# Patient Record
Sex: FEMALE | Race: WHITE | Employment: UNEMPLOYED | URBAN - METROPOLITAN AREA
[De-identification: names, ages, dates, MRNs, and addresses within clinical notes are randomized per-mention and may not be internally consistent; named-entity substitution may affect disease eponyms.]

---

## 2020-11-03 ENCOUNTER — HOSPITAL ENCOUNTER (OUTPATIENT)
Age: 25
Setting detail: OBSERVATION
Discharge: HOME | End: 2020-11-03
Attending: OBSTETRICS & GYNECOLOGY | Admitting: OBSTETRICS & GYNECOLOGY

## 2020-11-03 VITALS
SYSTOLIC BLOOD PRESSURE: 123 MMHG | TEMPERATURE: 97.7 F | DIASTOLIC BLOOD PRESSURE: 76 MMHG | HEART RATE: 88 BPM | RESPIRATION RATE: 16 BRPM

## 2020-11-03 PROBLEM — Z3A.34 34 WEEKS GESTATION OF PREGNANCY: Status: ACTIVE | Noted: 2020-11-03

## 2020-11-03 LAB
ABO/RH: NORMAL
ALBUMIN SERPL-MCNC: 3.8 G/DL (ref 3.5–5.2)
ALP BLD-CCNC: 103 U/L (ref 35–104)
ALT SERPL-CCNC: 8 U/L (ref 0–32)
ANION GAP SERPL CALCULATED.3IONS-SCNC: 11 MMOL/L (ref 7–16)
ANTIBODY SCREEN: NORMAL
AST SERPL-CCNC: 13 U/L (ref 0–31)
BACTERIA: ABNORMAL /HPF
BASOPHILS ABSOLUTE: 0.02 E9/L (ref 0–0.2)
BASOPHILS RELATIVE PERCENT: 0.2 % (ref 0–2)
BILIRUB SERPL-MCNC: <0.2 MG/DL (ref 0–1.2)
BILIRUBIN URINE: NEGATIVE
BLOOD, URINE: ABNORMAL
BUN BLDV-MCNC: 6 MG/DL (ref 6–20)
CALCIUM SERPL-MCNC: 10 MG/DL (ref 8.6–10.2)
CHLORIDE BLD-SCNC: 103 MMOL/L (ref 98–107)
CLARITY: CLEAR
CO2: 22 MMOL/L (ref 22–29)
COLOR: YELLOW
CREAT SERPL-MCNC: 0.5 MG/DL (ref 0.5–1)
EOSINOPHILS ABSOLUTE: 0.17 E9/L (ref 0.05–0.5)
EOSINOPHILS RELATIVE PERCENT: 1.8 % (ref 0–6)
EPITHELIAL CELLS, UA: ABNORMAL /HPF
FETAL FIBRONECTIN: NEGATIVE
GFR AFRICAN AMERICAN: >60
GFR NON-AFRICAN AMERICAN: >60 ML/MIN/1.73
GLUCOSE BLD-MCNC: 77 MG/DL (ref 74–99)
GLUCOSE URINE: NEGATIVE MG/DL
HCT VFR BLD CALC: 35.3 % (ref 34–48)
HEMOGLOBIN: 11.2 G/DL (ref 11.5–15.5)
IMMATURE GRANULOCYTES #: 0.05 E9/L
IMMATURE GRANULOCYTES %: 0.5 % (ref 0–5)
KETONES, URINE: ABNORMAL MG/DL
LEUKOCYTE ESTERASE, URINE: ABNORMAL
LYMPHOCYTES ABSOLUTE: 1.61 E9/L (ref 1.5–4)
LYMPHOCYTES RELATIVE PERCENT: 16.7 % (ref 20–42)
MCH RBC QN AUTO: 26.3 PG (ref 26–35)
MCHC RBC AUTO-ENTMCNC: 31.7 % (ref 32–34.5)
MCV RBC AUTO: 82.9 FL (ref 80–99.9)
MONOCYTES ABSOLUTE: 0.55 E9/L (ref 0.1–0.95)
MONOCYTES RELATIVE PERCENT: 5.7 % (ref 2–12)
NEUTROPHILS ABSOLUTE: 7.23 E9/L (ref 1.8–7.3)
NEUTROPHILS RELATIVE PERCENT: 75.1 % (ref 43–80)
NITRITE, URINE: NEGATIVE
PDW BLD-RTO: 14.3 FL (ref 11.5–15)
PH UA: 6.5 (ref 5–9)
PLATELET # BLD: 237 E9/L (ref 130–450)
PMV BLD AUTO: 10.3 FL (ref 7–12)
POTASSIUM SERPL-SCNC: 3.7 MMOL/L (ref 3.5–5)
PROTEIN UA: NEGATIVE MG/DL
RBC # BLD: 4.26 E12/L (ref 3.5–5.5)
RBC UA: ABNORMAL /HPF (ref 0–2)
SODIUM BLD-SCNC: 136 MMOL/L (ref 132–146)
SPECIFIC GRAVITY UA: >=1.03 (ref 1–1.03)
TOTAL PROTEIN: 7.2 G/DL (ref 6.4–8.3)
UROBILINOGEN, URINE: 0.2 E.U./DL
WBC # BLD: 9.6 E9/L (ref 4.5–11.5)
WBC UA: ABNORMAL /HPF (ref 0–5)

## 2020-11-03 PROCEDURE — 86592 SYPHILIS TEST NON-TREP QUAL: CPT

## 2020-11-03 PROCEDURE — 86901 BLOOD TYPING SEROLOGIC RH(D): CPT

## 2020-11-03 PROCEDURE — 87491 CHLMYD TRACH DNA AMP PROBE: CPT

## 2020-11-03 PROCEDURE — 99222 1ST HOSP IP/OBS MODERATE 55: CPT | Performed by: OBSTETRICS & GYNECOLOGY

## 2020-11-03 PROCEDURE — 86703 HIV-1/HIV-2 1 RESULT ANTBDY: CPT

## 2020-11-03 PROCEDURE — 76819 FETAL BIOPHYS PROFIL W/O NST: CPT

## 2020-11-03 PROCEDURE — 80053 COMPREHEN METABOLIC PANEL: CPT

## 2020-11-03 PROCEDURE — 76821 MIDDLE CEREBRAL ARTERY ECHO: CPT

## 2020-11-03 PROCEDURE — 81001 URINALYSIS AUTO W/SCOPE: CPT

## 2020-11-03 PROCEDURE — 87340 HEPATITIS B SURFACE AG IA: CPT

## 2020-11-03 PROCEDURE — G0378 HOSPITAL OBSERVATION PER HR: HCPCS

## 2020-11-03 PROCEDURE — 82731 ASSAY OF FETAL FIBRONECTIN: CPT

## 2020-11-03 PROCEDURE — 76820 UMBILICAL ARTERY ECHO: CPT

## 2020-11-03 PROCEDURE — 86900 BLOOD TYPING SEROLOGIC ABO: CPT

## 2020-11-03 PROCEDURE — 87591 N.GONORRHOEAE DNA AMP PROB: CPT

## 2020-11-03 PROCEDURE — 99201 HC NEW PT, OUTPT VISIT LEVEL 1: CPT

## 2020-11-03 PROCEDURE — 76811 OB US DETAILED SNGL FETUS: CPT

## 2020-11-03 PROCEDURE — 86850 RBC ANTIBODY SCREEN: CPT

## 2020-11-03 PROCEDURE — 36415 COLL VENOUS BLD VENIPUNCTURE: CPT

## 2020-11-03 PROCEDURE — 85025 COMPLETE CBC W/AUTO DIFF WBC: CPT

## 2020-11-03 PROCEDURE — 86762 RUBELLA ANTIBODY: CPT

## 2020-11-03 RX ORDER — SODIUM CHLORIDE, SODIUM LACTATE, POTASSIUM CHLORIDE, AND CALCIUM CHLORIDE .6; .31; .03; .02 G/100ML; G/100ML; G/100ML; G/100ML
500 INJECTION, SOLUTION INTRAVENOUS ONCE
Status: DISCONTINUED | OUTPATIENT
Start: 2020-11-03 | End: 2020-11-03 | Stop reason: HOSPADM

## 2020-11-03 RX ORDER — CEPHALEXIN 500 MG/1
500 CAPSULE ORAL 2 TIMES DAILY
Qty: 14 CAPSULE | Refills: 0 | Status: ON HOLD | OUTPATIENT
Start: 2020-11-03 | End: 2020-12-20 | Stop reason: HOSPADM

## 2020-11-03 RX ORDER — SODIUM CHLORIDE, SODIUM LACTATE, POTASSIUM CHLORIDE, CALCIUM CHLORIDE 600; 310; 30; 20 MG/100ML; MG/100ML; MG/100ML; MG/100ML
INJECTION, SOLUTION INTRAVENOUS CONTINUOUS
Status: DISCONTINUED | OUTPATIENT
Start: 2020-11-03 | End: 2020-11-03 | Stop reason: HOSPADM

## 2020-11-03 SDOH — HEALTH STABILITY: MENTAL HEALTH: HOW OFTEN DO YOU HAVE A DRINK CONTAINING ALCOHOL?: NEVER

## 2020-11-03 NOTE — PROGRESS NOTES
Patient was evaluated by Dr. Anamaria Major. As by Dr. Anamaria Major BB size corresponds to the gestational age of 34-weeks and from his point of view patient can be discharged.   We will order prenatal lab tests for the results of the fetal fibronectin test if fetal fibronectin test is negative we will discharge the patient and follow-up in the clinic

## 2020-11-03 NOTE — H&P
wheezing  CARDIOVASCULAR:  normal S1 and S2  ABDOMEN: Gravid, soft, nontender      Fetal fibronectin test obtained. No history of recent sexual intercourse for the past 24 hours. .    Cervix:             Dilation:  Closed         Effacement:  Long            Fetal heart rate:         Baseline Heart Rate:  140        Accelerations:  present       Decelerations: absent       Variability:  moderate    Contraction frequency: 0 minutes        General Labs:    CBC: No results found for: WBC, RBC, HGB, HCT, MCV, RDW, PLT  CMP:  No results found for: NA, K, CL, CO2, BUN, PROT, ALB  U/A:  No components found for: Yvonne [place], USPGRAV, UPH, UPROTEIN, UGLUCOSE, UKETONE, UBILI, UBLOOD, [place], [place], [place], [facility], [place], [facility], [facility], [place]        ASSESSMENT & PLAN:   82-year-old female G1, P0 with an EDC of December 12 without any prenatal care complaining of lower abdominal cramping. Reports having urinary frequency. She states that she got prenatal care in [place] and had some blood Test.  Plan admit, observation, urine analysis, fetal fibronectin, MFM consultation, obtain her lab prenatal records from [place]. If no labs available order prenatal lab test should be ordered.

## 2020-11-03 NOTE — PROGRESS NOTES
Patient and  state they had prenatal care with [facility] and gave me a number to reach them at. Attempted to call and were told they cannot access medical records after hours and have no lab to contact at this time.

## 2020-11-04 LAB
HEPATITIS B SURFACE ANTIGEN INTERPRETATION: NORMAL
HIV-1 AND HIV-2 ANTIBODIES: NORMAL
RPR: NORMAL
RUBELLA ANTIBODY IGG: NORMAL

## 2020-11-04 NOTE — PROGRESS NOTES
Patient refusing GBS swab at this time. Patient educated on importance on swab by Monique Goel. Still refusing.

## 2020-11-04 NOTE — PROGRESS NOTES
Patient okay for discharge per house. Patient discharged, instructions explained to patient and FOB. All questions answered. Explained prescription -- picking up medication, taking medication for entirety, dosage and times. Verbalizes understanding. Discussed follow up in the clinic - given phone number and address to clinic. Patient and FOB agreeable. Patient ambulatory on discharge.

## 2020-11-04 NOTE — PROGRESS NOTES
Discussed collection of GBS screening with  and patient at length. Patient declines GBS screen at this time.

## 2020-11-05 ENCOUNTER — TELEPHONE (OUTPATIENT)
Dept: ADMINISTRATIVE | Age: 25
End: 2020-11-05

## 2020-11-05 ENCOUNTER — TELEPHONE (OUTPATIENT)
Dept: OBGYN | Age: 25
End: 2020-11-05

## 2020-11-05 NOTE — TELEPHONE ENCOUNTER
Pt  New to [facility]  from [place]. 8 months 1 week .  Appt  Rejected by   Scheduling  In  Dec .

## 2020-11-05 NOTE — TELEPHONE ENCOUNTER
Patient called, spoke to  Caitie. Appointment scheduled for 11/11/20 @ 10:30 per Dr. Lilo Garner. Caitie given office address and phone number as well.     -Johana, 11/5/20

## 2020-11-06 LAB
C. TRACHOMATIS DNA ,URINE: NEGATIVE
N. GONORRHOEAE DNA, URINE: NEGATIVE
SOURCE: NORMAL

## 2020-11-11 ENCOUNTER — INITIAL PRENATAL (OUTPATIENT)
Dept: OBGYN | Age: 25
End: 2020-11-11

## 2020-11-11 VITALS — DIASTOLIC BLOOD PRESSURE: 63 MMHG | HEART RATE: 95 BPM | SYSTOLIC BLOOD PRESSURE: 135 MMHG | WEIGHT: 196 LBS

## 2020-11-11 PROBLEM — Z3A.34 34 WEEKS GESTATION OF PREGNANCY: Status: RESOLVED | Noted: 2020-11-03 | Resolved: 2020-11-11

## 2020-11-11 LAB
AMPHETAMINE SCREEN, URINE: NOT DETECTED
BARBITURATE SCREEN URINE: NOT DETECTED
BENZODIAZEPINE SCREEN, URINE: NOT DETECTED
CANNABINOID SCREEN URINE: NOT DETECTED
COCAINE METABOLITE SCREEN URINE: NOT DETECTED
FENTANYL SCREEN, URINE: NOT DETECTED
GLUCOSE URINE, POC: NORMAL
Lab: NORMAL
METHADONE SCREEN, URINE: NOT DETECTED
OPIATE SCREEN URINE: NOT DETECTED
OXYCODONE URINE: NOT DETECTED
PHENCYCLIDINE SCREEN URINE: NOT DETECTED
PROTEIN UA: ABNORMAL

## 2020-11-11 PROCEDURE — 99203 OFFICE O/P NEW LOW 30 MIN: CPT | Performed by: OBSTETRICS & GYNECOLOGY

## 2020-11-11 PROCEDURE — 81002 URINALYSIS NONAUTO W/O SCOPE: CPT | Performed by: OBSTETRICS & GYNECOLOGY

## 2020-11-11 NOTE — PROGRESS NOTES
Karan Zamudio      present, helped to translate. Patient presents for new OB visit. Patient was seen on L&D 11/3/20. She was a transfer from [facility], though only had limited prenatal care. Per patient her EDC is 12/12/20. Patient had prenatal labs and GC/ CT at that time. Patient refused GBS. Will collect today. Patient did have ultrasound by Dr. Hermelinda Mcgrath on L&D. Growth consistent with 34 weeks at that time. No report is available. Patient was treated for UTI, will collect repeat urine culture. Discussed flu vaccine, patient declines. Baby is moving. Denies contractions. History reviewed. No pertinent past medical history. History reviewed. No pertinent surgical history. History reviewed. No pertinent family history. Current Outpatient Medications:     Prenatal MV-Min-Fe Fum-FA-DHA (PRENATAL 1 PO), Take by mouth, Disp: , Rfl:     cephALEXin (KEFLEX) 500 MG capsule, Take 1 capsule by mouth 2 times daily, Disp: 14 capsule, Rfl: 0     No Known Allergies     Social History     Tobacco History     Smoking Status  Never Smoker    Smokeless Tobacco Use  Never Used          Alcohol History     Alcohol Use Status  Never          Drug Use     Drug Use Status  Never          Sexual Activity     Sexually Active  Not Asked                 Vitals:    11/11/20 1047   BP: 135/63   Pulse: 95        Physical Exam:  General: pleasant, alert     Pelvic exam: GBS collected     Fetal heart rate: 145  Fundal height: 34cm    Luis Carlos Marley was seen today for initial prenatal visit. Diagnoses and all orders for this visit:    No prenatal care in current pregnancy in third trimester    Prenatal care in third trimester  -     Culture, Urine; Future  -     Urine Drug Screen; Future  -     Ambulatory referral to Maternal Fetal  -     Strep B screen      Will sign release of records for prenatal records from [place]. Return in about 1 week (around 11/18/2020) for OB visit.      Ilene Pallas, MD

## 2020-11-11 NOTE — PROGRESS NOTES
New Patient alert and pleasant with no complaints. Here today with  for initial prenatal visit. Fetal heart tones obtained without difficulty. Urine for glucose  obtained with negative results. Urine for protein obtained with +1 results  Urine for culture obtained, labeled and sent to lab  Strep B culture obtained, labeled and sent to lab  Patient transfer from [place]. Patient signed medical release form at this time  Discharge instructions have been discussed with the patient. Patient advised to call our office with any questions or concerns. Voiced understanding.

## 2020-11-13 LAB — URINE CULTURE, ROUTINE: NORMAL

## 2020-11-14 LAB — GROUP B STREP CULTURE: NORMAL

## 2020-11-18 ENCOUNTER — ROUTINE PRENATAL (OUTPATIENT)
Dept: OBGYN | Age: 25
End: 2020-11-18

## 2020-11-18 VITALS — DIASTOLIC BLOOD PRESSURE: 71 MMHG | SYSTOLIC BLOOD PRESSURE: 125 MMHG | WEIGHT: 194 LBS | HEART RATE: 97 BPM

## 2020-11-18 LAB
GLUCOSE URINE, POC: NEGATIVE
PROTEIN UA: NEGATIVE

## 2020-11-18 PROCEDURE — 81002 URINALYSIS NONAUTO W/O SCOPE: CPT | Performed by: OBSTETRICS & GYNECOLOGY

## 2020-11-18 PROCEDURE — 99213 OFFICE O/P EST LOW 20 MIN: CPT | Performed by: OBSTETRICS & GYNECOLOGY

## 2020-11-18 NOTE — PROGRESS NOTES
Patient alert and pleasant with no complaints. Here today for prenatal visit. Fetal heart tones obtained without difficulty. Urine for glucose and protein obtained with negative results. Discharge instructions have been discussed with the patient. Patient advised to call our office with any questions or concerns. Voiced understanding.

## 2020-11-23 ENCOUNTER — ROUTINE PRENATAL (OUTPATIENT)
Dept: OBGYN | Age: 25
End: 2020-11-23

## 2020-11-23 VITALS — WEIGHT: 201 LBS | DIASTOLIC BLOOD PRESSURE: 70 MMHG | HEART RATE: 95 BPM | SYSTOLIC BLOOD PRESSURE: 120 MMHG

## 2020-11-23 LAB
GLUCOSE URINE, POC: NEGATIVE
PROTEIN UA: NEGATIVE

## 2020-11-23 PROCEDURE — 99213 OFFICE O/P EST LOW 20 MIN: CPT | Performed by: OBSTETRICS & GYNECOLOGY

## 2020-11-23 PROCEDURE — 81002 URINALYSIS NONAUTO W/O SCOPE: CPT | Performed by: OBSTETRICS & GYNECOLOGY

## 2020-11-23 NOTE — PROGRESS NOTES
Claudia Reyes    Patient presents for routine prenatal visit today at 37w2d. Appointment with MFM 11/24/20. Denies complaints  Denies VB, or cramping  Feeling movement at this time. Reviewed labor precautions and kick counts. Fetal Hr: 135    GBS negative  Gonorrhea/ chlamydia negative     Discussed flu vaccine and Tdap, patient again declines. All questions and concerns addressed at this time      ICD-10-CM    1. Prenatal care in third trimester  Z34.93 POCT urine qual dipstick protein     POCT urine qual dipstick glucose        Return in about 1 week (around 11/30/2020) for OB visit.     Carlene Collins MD

## 2020-11-24 ENCOUNTER — ROUTINE PRENATAL (OUTPATIENT)
Dept: OBGYN CLINIC | Age: 25
End: 2020-11-24

## 2020-11-24 VITALS
HEART RATE: 107 BPM | SYSTOLIC BLOOD PRESSURE: 114 MMHG | DIASTOLIC BLOOD PRESSURE: 77 MMHG | TEMPERATURE: 97.1 F | WEIGHT: 199 LBS

## 2020-11-24 PROBLEM — Z3A.37 37 WEEKS GESTATION OF PREGNANCY: Status: ACTIVE | Noted: 2020-11-24

## 2020-11-24 LAB
GLUCOSE URINE, POC: NEGATIVE
PROTEIN UA: POSITIVE

## 2020-11-24 PROCEDURE — 76820 UMBILICAL ARTERY ECHO: CPT | Performed by: OBSTETRICS & GYNECOLOGY

## 2020-11-24 PROCEDURE — 99203 OFFICE O/P NEW LOW 30 MIN: CPT | Performed by: OBSTETRICS & GYNECOLOGY

## 2020-11-24 PROCEDURE — 76819 FETAL BIOPHYS PROFIL W/O NST: CPT | Performed by: OBSTETRICS & GYNECOLOGY

## 2020-11-24 PROCEDURE — 81002 URINALYSIS NONAUTO W/O SCOPE: CPT | Performed by: OBSTETRICS & GYNECOLOGY

## 2020-11-24 PROCEDURE — 99213 OFFICE O/P EST LOW 20 MIN: CPT | Performed by: OBSTETRICS & GYNECOLOGY

## 2020-11-24 PROCEDURE — 76821 MIDDLE CEREBRAL ARTERY ECHO: CPT | Performed by: OBSTETRICS & GYNECOLOGY

## 2020-11-24 PROCEDURE — 76816 OB US FOLLOW-UP PER FETUS: CPT | Performed by: OBSTETRICS & GYNECOLOGY

## 2020-11-24 NOTE — PATIENT INSTRUCTIONS
Please arrive for your scheduled appointment at least 15 minutes early with your actual insurance card+ a photo ID. Also if you need any refills ordered or have questions, it may take up 48 hours to reply. Please allow ample time for your refills. Call me when you use last refill. Thank you for your cooperation. Any questions contact Mariah at [phone]. If you are experiencing an emergency and need immediate help, call 911 or go to go emergency room or labor and delivery. if you are sick, not feeling well or have an infectious process going on please reschedule your appointment by calling [phone]. Also if any family members are not feeling well, please do not bring them to your appointment. We appreciate your cooperation. We are doing this in order to protect our pregnant mothers+ their babies. Call your primary obstetrician with bleeding, leaking of fluid, abdominal tenderness, headache, blurry vision, epigastric pain and increased urinary frequency. Do kick counts after dinner. Call your primary obstetrician if less than 10 kicks in 2 hours after dinner. Call your primary obstetrician with bleeding, leaking of fluid, abdominal tenderness, headache, blurry vision, epigastric pain and increased urinary frequency. You might be having an NST at your next appt. Please eat a large snack or breakfast before coming to office. Thank you  Patient Education        Week 40 of Your Pregnancy: Care Instructions  Your Care Instructions     You are near the end of your pregnancy--and you're probably pretty uncomfortable. It may be harder to walk around. Lying down probably isn't comfortable either. You may have trouble getting to sleep or staying asleep. Most women deliver their babies between 40 and 41 weeks. This is a good time to think about packing a bag for the hospital with items you'll need. Then you'll be ready when labor starts. Follow-up care is a key part of your treatment and safety.  Be sure to make and go to all appointments, and call your doctor if you are having problems. It's also a good idea to know your test results and keep a list of the medicines you take. How can you care for yourself at home? Learn about breastfeeding  · Breastfeeding is best for your baby and good for you. · Breast milk has antibodies to help your baby fight infections. · Mothers who breastfeed often lose weight faster, because making milk burns calories. · Learning the best ways to hold your baby will make breastfeeding easier. · Let your partner bathe and diaper the baby to keep your partner from feeling left out. Snuggle together when you breastfeed. · You may want to learn how to use a breast pump and store your milk. · If you choose to bottle feed, make the feeding feel like breastfeeding so you can bond with your baby. Always hold your baby and the bottle. Do not prop bottles or let your baby fall asleep with a bottle. Learn about crying  · It is common for babies to cry for 1 to 3 hours a day. Some cry more, some cry less. · Babies don't cry to make you upset or because you are a bad parent. · Crying is how your baby communicates. Your baby may be hungry; have gas; need a diaper change; or feel cold, warm, tired, lonely, or tense. Sometimes babies cry for unknown reasons. · If you respond to your baby's needs, he or she will learn to trust you. · Try to stay calm when your baby cries. Your baby may get more upset if he or she senses that you are upset. Know how to care for your   · Your baby's umbilical cord stump will drop off on its own, usually between 1 and 2 weeks. To care for your baby's umbilical cord area:  ? Clean the area at the bottom of the cord 2 or 3 times a day. ? Pay special attention to the area where the cord attaches to the skin. ? Keep the diaper folded below the cord. ? Use a damp washcloth or cotton ball to sponge bathe your baby until the stump has come off.   · Your baby's first dark stool is called meconium. After the meconium is passed, your baby will develop his or her own bowel pattern. ? Some babies, especially  babies, have several bowel movements a day. Others have one or two a day, or one every 2 to 3 days. ?  babies often have loose, yellow stools. Formula-fed babies have more formed stools. ? If your baby's stools look like little pellets, he or she is constipated. After 2 days of constipation, call your baby's doctor. · If your baby will be circumcised, you can care for him at home. ? Gently rinse his penis with warm water after every diaper change. Do not try to remove the film that forms on the penis. This film will go away on its own. Pat dry. ? Put petroleum ointment, such as Vaseline, on the area of the diaper that will touch your baby's penis. This will keep the diaper from sticking to your baby. ? Ask the doctor about giving your baby acetaminophen (Tylenol) for pain. Where can you learn more? Go to https://Sponge.Postmaster. org and sign in to your InMage Systems account. Enter 68 [id number] in the [employer] box to learn more about \"Week 37 of Your Pregnancy: Care Instructions. \"     If you do not have an account, please click on the \"Sign Up Now\" link. Current as of: February 11, 2020               Content Version: 12.6  © 1136-0527 InVitae, Incorporated. Care instructions adapted under license by [facility] ([facility]). If you have questions about a medical condition or this instruction, always ask your healthcare professional. [place] [address] any warranty or liability for your use of this information. Patient Education        Learning About When to Call Your Doctor During Pregnancy (After 20 Weeks)  Your Care Instructions  It's common to have concerns about what might be a problem during pregnancy.  Although most pregnant women don't have any serious problems, it's important to know when to call your doctor if you have certain symptoms or signs of labor. These are general suggestions. Your doctor may give you some more information about when to call. When to call your doctor (after 20 weeks)  Call 911 anytime you think you may need emergency care. For example, call if:  · You have severe vaginal bleeding. · You have sudden, severe pain in your belly. · You passed out (lost consciousness). · You have a seizure. · You see or feel the umbilical cord. · You think you are about to deliver your baby and can't make it safely to the hospital.  Call your doctor now or seek immediate medical care if:  · You have vaginal bleeding. · You have belly pain. · You have a fever. · You have symptoms of preeclampsia, such as:  ? Sudden swelling of your face, hands, or feet. ? New vision problems (such as dimness, blurring, or seeing spots). ? A severe headache. · You have a sudden release of fluid from your vagina. (You think your water broke.)  · You think that you may be in labor. This means that you've had at least 6 contractions in an hour. · You notice that your baby has stopped moving or is moving much less than normal.  · You have symptoms of a urinary tract infection. These may include:  ? Pain or burning when you urinate. ? A frequent need to urinate without being able to pass much urine. ? Pain in the flank, which is just below the rib cage and above the waist on either side of the back. ? Blood in your urine. Watch closely for changes in your health, and be sure to contact your doctor if:  · You have vaginal discharge that smells bad. · You have skin changes, such as:  ? A rash. ? Itching. ? Yellow color to your skin. · You have other concerns about your pregnancy. If you have labor signs at 37 weeks or more  If you have signs of labor at 37 weeks or more, your doctor may tell you to call when your labor becomes more active. Symptoms of active labor include:  · Contractions that are regular.   · Contractions that are less than 5 minutes apart. · Contractions that are hard to talk through. Follow-up care is a key part of your treatment and safety. Be sure to make and go to all appointments, and call your doctor if you are having problems. It's also a good idea to know your test results and keep a list of the medicines you take. Where can you learn more? Go to https://CardioLogspepicewEpicPledge.AndrewBurnett.com Ltd. org and sign in to your Terralliance account. Enter  in the Mastodon C box to learn more about \"Learning About When to Call Your Doctor During Pregnancy (After 20 Weeks). \"     If you do not have an account, please click on the \"Sign Up Now\" link. Current as of: February 11, 2020               Content Version: 12.6  © 4442-1717 Roost, b-datum. Care instructions adapted under license by [facility] ([facility]). If you have questions about a medical condition or this instruction, always ask your healthcare professional. [place] [address] any warranty or liability for your use of this information. Patient Education        Counting Your Baby's Kicks: Care Instructions  Your Care Instructions     Counting your baby's kicks is one way your doctor can tell that your baby is healthy. Most women--especially in a first pregnancy--feel their baby move for the first time between 16 and 22 weeks. The movement may feel like flutters rather than kicks. Your baby may move more at certain times of the day. When you are active, you may notice less kicking than when you are resting. At your prenatal visits, your doctor will ask whether the baby is active. In your last trimester, your doctor may ask you to count the number of times you feel your baby move. Follow-up care is a key part of your treatment and safety. Be sure to make and go to all appointments, and call your doctor if you are having problems. It's also a good idea to know your test results and keep a list of the medicines you take.   How do you count fetal kicks? · A common method of checking your baby's movement is to count the number of kicks or moves you feel in 1 hour. Ten movements (such as kicks, flutters, or rolls) in 1 hour are normal. Some doctors suggest that you count in the morning until you get to 10 movements. Then you can quit for that day and start again the next day. · Pick your baby's most active time of day to count. This may be any time from morning to evening. · If you do not feel 10 movements in an hour, your baby may be sleeping. Wait for the next hour and count again. When should you call for help? Call your doctor now or seek immediate medical care if:    · You noticed that your baby has stopped moving or is moving much less than normal.   Watch closely for changes in your health, and be sure to contact your doctor if you have any problems. Where can you learn more? Go to https://[place] Thin brick.Coherent Path. org and sign in to your Babycare account. Enter R167 in the BiometryCloud box to learn more about \"Counting Your Baby's Kicks: Care Instructions. \"     If you do not have an account, please click on the \"Sign Up Now\" link. Current as of: February 11, 2020               Content Version: 12.6  © 1468-7370 CUPR, Incorporated. Care instructions adapted under license by [facility] ([facility]). If you have questions about a medical condition or this instruction, always ask your healthcare professional. [place] [address] any warranty or liability for your use of this information.

## 2020-12-01 ENCOUNTER — HOSPITAL ENCOUNTER (OUTPATIENT)
Age: 25
Discharge: HOME | End: 2020-12-01
Attending: OBSTETRICS & GYNECOLOGY | Admitting: OBSTETRICS & GYNECOLOGY

## 2020-12-01 VITALS
RESPIRATION RATE: 16 BRPM | DIASTOLIC BLOOD PRESSURE: 74 MMHG | HEART RATE: 73 BPM | TEMPERATURE: 98.8 F | WEIGHT: 194 LBS | HEIGHT: 66 IN | SYSTOLIC BLOOD PRESSURE: 127 MMHG | BODY MASS INDEX: 31.18 KG/M2

## 2020-12-01 PROBLEM — Z3A.38 38 WEEKS GESTATION OF PREGNANCY: Status: ACTIVE | Noted: 2020-12-01

## 2020-12-01 LAB
AMNISURE, POC: NEGATIVE
Lab: NORMAL
NEGATIVE QC PASS/FAIL: NORMAL
POSITIVE QC PASS/FAIL: NORMAL

## 2020-12-01 PROCEDURE — 99201 HC NEW PT, OUTPT VISIT LEVEL 1: CPT

## 2020-12-01 PROCEDURE — 99215 OFFICE O/P EST HI 40 MIN: CPT | Performed by: MIDWIFE

## 2020-12-02 ENCOUNTER — ROUTINE PRENATAL (OUTPATIENT)
Dept: OBGYN | Age: 25
End: 2020-12-02

## 2020-12-02 VITALS
HEART RATE: 88 BPM | WEIGHT: 200 LBS | DIASTOLIC BLOOD PRESSURE: 64 MMHG | SYSTOLIC BLOOD PRESSURE: 121 MMHG | BODY MASS INDEX: 32.14 KG/M2

## 2020-12-02 PROBLEM — Z3A.37 37 WEEKS GESTATION OF PREGNANCY: Status: RESOLVED | Noted: 2020-11-24 | Resolved: 2020-12-02

## 2020-12-02 LAB
GLUCOSE URINE, POC: NEGATIVE
PROTEIN UA: ABNORMAL

## 2020-12-02 PROCEDURE — 81002 URINALYSIS NONAUTO W/O SCOPE: CPT | Performed by: OBSTETRICS & GYNECOLOGY

## 2020-12-02 PROCEDURE — 99201 HC NEW PT, OUTPT VISIT LEVEL 1: CPT

## 2020-12-02 PROCEDURE — 99213 OFFICE O/P EST LOW 20 MIN: CPT | Performed by: OBSTETRICS & GYNECOLOGY

## 2020-12-02 NOTE — PROGRESS NOTES
presents for LOF since 1100am today. Patient states she felt leaking 3 times today but hawk currently leaking any fluid. States +FM, denies contractions, but feels pressure from fetal movement.

## 2020-12-02 NOTE — PROGRESS NOTES
Leif Moreno    Patient presents for routine prenatal visit today at 38w4d. Saw MFM 11/24/20. Was evaluated on L&D for leakage of fluid yesterday. Was not ruptured and sent home. Denies complaints  Denies VB, or cramping  Feeling movement at this time. Reviewed labor precautions and kick counts. Fetal Hr: 145    GBS negative  Gonorrhea/ chlamydia negative     All questions and concerns addressed at this time      ICD-10-CM    1. Prenatal care in third trimester  Z34.93    2. 38 weeks gestation of pregnancy  Z3A.38         Return in about 1 week (around 12/9/2020) for OB visit.     Hannah Vazquez MD

## 2020-12-02 NOTE — PROGRESS NOTES
Discharge instructions given with pt verbalizing understanding. Pt  Off efm and discharged home with so.

## 2020-12-02 NOTE — H&P
Department of Obstetrics and Gynecology   Obstetrics History and Physical      CHIEF COMPLAINT:  contractions    HISTORY OF PRESENT ILLNESS:      The patient is a 27 y.o. Giuliana Love at 38 weeks 3 days  Patient presents with a chief complaint as above and is being admitted for leakage of fluid and contractions throughout the day.  + FM. Denies LOF/VB. Denies complications with pregnancy. DATES:    Patient's last menstrual period was 2020. Estimated Date of Delivery: 20    PRENATAL CARE:    Provider:  Carmen    Blood Type/Rh:  A Positive  Hepatitis B Surface Antigen: nonreactive  Gonorrhea:  negative  Chlamydia:  negative  Group B Strep:  negative      PAST OB HISTORY        Depression:  No      Post-partum depression:  No      Diabetes:  No      Gestational Diabetes:  No      Thyroid Disease:  No      Chronic HTN:  No      Gestation HTN:  No      Pre-eclampsia:  No      Seizure disorder:  No      Asthma:  No      Clotting disorder:  No      :  No      Tubal ligation:  No      D & C:  No      Cerclage:  No      LEEP:  No      Myomectomy:  No    OB History    Para Term  AB Living   1 0 0 0 0 0   SAB TAB Ectopic Molar Multiple Live Births                    # Outcome Date GA Lbr Armond/2nd Weight Sex Delivery Anes PTL Lv   1 Current                    Past Medical History:        Diagnosis Date    Abnormal Pap smear of cervix      Past Surgical History:    History reviewed. No pertinent surgical history. Social History:    Denies smoking, drugs or alcohol use  Family History:   History reviewed. No pertinent family history. Medications Prior to Admission:  Medications Prior to Admission: Prenatal MV-Min-Fe Fum-FA-DHA (PRENATAL 1 PO), Take by mouth  cephALEXin (KEFLEX) 500 MG capsule, Take 1 capsule by mouth 2 times daily (Patient not taking: Reported on 2020)  Allergies:  Patient has no known allergies.         PHYSICAL EXAM:    VITALS:  /74   Pulse 73   Temp 98.8 °F (37.1 °C) (Oral)   Resp 16   Ht 5' 6.14\" (1.68 m)   Wt 194 lb (88 kg)   LMP 03/07/2020   BMI 31.18 kg/m²       General appearance:  awake, alert, cooperative, no apparent distress, and appears stated age  Neurologic:  Awake, alert, oriented to name, place and time.     Lungs:  No increased work of breathing, good air exchange, clear to auscultation bilaterally, no crackles or wheezing  Heart:  Normal apical impulse, regular rate and rhythm  Abdomen:  Gravid, soft, nontender   Fetal heart rate:  Baseline Heart Rate 135, accelerations:  present  long term variability:  moderate  decelerations:  absent  Pelvis:  External Genitalia: General appearance; normal, Hair distribution; normal, Lesions absent  Vagina: Pelvic support normal  Cervix:    DILATION:  1 cm  EFFACEMENT:   Long  STATION:  -3 cm  CONSISTENCY:  soft  POSITION:  posterior    PRESENTATION: cephalic      Contraction frequency:  0 minutes  Membranes:  Intact      ASSESSMENT AND PLAN:  Discussed with   Pt may be discharged to home  Reviewed s/s of labor    Phong Luevano CNM

## 2020-12-07 NOTE — PROGRESS NOTES
[facility]  [address] [place] [address].  [facility] [place], [place]. [address]  Ph: [phone] Fax: [phone]  2020    RE: Yury Boggs 89  Dear Dr. Reginaldo Rosenberg:       She was seen in our office today for  testing  REASON FOR CONSULTATION:       20yo  from [place] ([facility] speaks a little [place] .  Late Prenatal Care o09.33  - 1 visit in [place] [address].  Pelvic / Abdominal Pain o26.891  She reports good fetal movement and no bleeding or fluid leaking. An ultrasound evaluation was done today. Please refer to the enclosed copy of the ultrasound report for further detailed information. ULTRASOUND IMPRESSION:    ? Within developmental and technical limits of ultrasound assessment,   Vertex male fetus @ 40 w  3d. The fetus is measuring appropriate for gestational age. 3105g ( 6:13)   62% ile   There has been good interval growth and development . Active, responsive baby. The amniotic fluid volume appears normal.   The biophysical profile is reassuring with a score of 6/8. - ) for breathing not within time criteria  - Followup Umbilical artery Doppler studies are reassuring with good end-diastolic flow. Middle Cerebral Artery flows are reassuring with appropriate impedance and adaptation. Cerebroplacental ratio is favorable ( >1.0)    She noted fetal movement, no cramping or contractions .  ~~Overall encouraging report today  RECOMMENDATIONS:   Followup visit PRN    MFM Appointment has been tentatively scheduled  2020   Followup timing recommendations during current COVID surge may change depending on community status and relative benefit/ risk   COVID precautions reviewed- advised to check with hospital before arrival     Further evaluation and management will be dependent on her clinical presentation and the results of her testing. The patient is to continue to follow with you in your office for ongoing obstetric care.   Sincerely,    Aden Gastelum, MD NIX was present during her visit , supervised the ultrasound examination and provided the patient evaluation and management. I spent 16 minutes with the patient of which greater than 50% of the time was used to  the patient, discuss complications and problems related to her pregnancy, or coordinating her care. I answered all of her questions to her satisfaction.

## 2020-12-09 ENCOUNTER — ROUTINE PRENATAL (OUTPATIENT)
Dept: OBGYN | Age: 25
End: 2020-12-09

## 2020-12-09 VITALS
SYSTOLIC BLOOD PRESSURE: 115 MMHG | WEIGHT: 199 LBS | BODY MASS INDEX: 31.98 KG/M2 | TEMPERATURE: 98.9 F | DIASTOLIC BLOOD PRESSURE: 72 MMHG | HEART RATE: 94 BPM

## 2020-12-09 LAB
GLUCOSE URINE, POC: NORMAL
PROTEIN UA: POSITIVE

## 2020-12-09 PROCEDURE — 81002 URINALYSIS NONAUTO W/O SCOPE: CPT | Performed by: OBSTETRICS & GYNECOLOGY

## 2020-12-09 PROCEDURE — 99212 OFFICE O/P EST SF 10 MIN: CPT | Performed by: OBSTETRICS & GYNECOLOGY

## 2020-12-09 PROCEDURE — 99213 OFFICE O/P EST LOW 20 MIN: CPT | Performed by: OBSTETRICS & GYNECOLOGY

## 2020-12-09 NOTE — PROGRESS NOTES
Here for prenatal visit today. Jackelin Martinez Has seen MFM, was 37 weeks 3 days. Given EDC is 12/12/2020. GBS was Negative. Very limited prenatal care. Good movement  translates when needed. Denies any problems at this time. RTC 1 week.

## 2020-12-11 ENCOUNTER — HOSPITAL ENCOUNTER (OUTPATIENT)
Age: 25
Discharge: HOME | End: 2020-12-11
Attending: OBSTETRICS & GYNECOLOGY | Admitting: OBSTETRICS & GYNECOLOGY

## 2020-12-11 VITALS
RESPIRATION RATE: 18 BRPM | SYSTOLIC BLOOD PRESSURE: 118 MMHG | HEART RATE: 90 BPM | TEMPERATURE: 99.5 F | DIASTOLIC BLOOD PRESSURE: 72 MMHG

## 2020-12-11 PROBLEM — Z3A.39 39 WEEKS GESTATION OF PREGNANCY: Status: ACTIVE | Noted: 2020-12-11

## 2020-12-11 PROCEDURE — 99211 OFF/OP EST MAY X REQ PHY/QHP: CPT

## 2020-12-12 NOTE — PROGRESS NOTES
WRITTEN AND ORAL DISCHARGE INSTRUCTIONS GIVEN TO PT. VERBALIZED UNDERSTANDING. NO QUESTIONS AT THIS TIME.  PT AMBULATORY OFF UNIT ACCOMPANIED BY S/O.

## 2020-12-12 NOTE — H&P
CHIEF COMPLAINT:  Cramping and spotting    HISTORY OF PRESENT ILLNESS:      The patient is a 27 y.o. female at 37w11d presents with spotting one time with wiping and cramping around 10:00 am. Nothing since. Her  couldn't bring her until now. +FM  OB History        1    Para   0    Term   0       0    AB   0    Living   0       SAB        TAB        Ectopic        Molar        Multiple        Live Births                Patient presents with a chief complaint as above and is being admitted for observation    Estimated Due Date: Estimated Date of Delivery: 20    PRENATAL CARE:    Complicated by: none    PAST OB HISTORY  OB History        1    Para   0    Term   0       0    AB   0    Living   0       SAB        TAB        Ectopic        Molar        Multiple        Live Births                    Past Medical History:        Diagnosis Date    Abnormal Pap smear of cervix      Past Surgical History:    No past surgical history on file. Allergies:  Patient has no known allergies.   Social History:    Social History     Socioeconomic History    Marital status:      Spouse name: Not on file    Number of children: Not on file    Years of education: Not on file    Highest education level: Not on file   Occupational History    Not on file   Social Needs    Financial resource strain: Not on file    Food insecurity     Worry: Not on file     Inability: Not on file   [employer] needs     Medical: Not on file     Non-medical: Not on file   Tobacco Use    Smoking status: Never Smoker    Smokeless tobacco: Never Used   Substance and Sexual Activity    Alcohol use: Never     Frequency: Never    Drug use: Never    Sexual activity: Yes     Partners: Male   Lifestyle    Physical activity     Days per week: Not on file     Minutes per session: Not on file    Stress: Not on file   Relationships    Social connections     Talks on phone: Not on file     Gets together: Not on file     Attends Quaker service: Not on file     Active member of club or organization: Not on file     Attends meetings of clubs or organizations: Not on file     Relationship status: Not on file    Intimate partner violence     Fear of current or ex partner: Not on file     Emotionally abused: Not on file     Physically abused: Not on file     Forced sexual activity: Not on file   Other Topics Concern    Not on file   Social History Narrative    Not on file     Family History:   No family history on file. Medications Prior to Admission:  Medications Prior to Admission: Prenatal MV-Min-Fe Fum-FA-DHA (PRENATAL 1 PO), Take by mouth  cephALEXin (KEFLEX) 500 MG capsule, Take 1 capsule by mouth 2 times daily (Patient not taking: Reported on 11/18/2020)    REVIEW OF SYSTEMS:    CONSTITUTIONAL:  negative  RESPIRATORY:  negative  CARDIOVASCULAR:  negative  GASTROINTESTINAL:  negative  ALLERGIC/IMMUNOLOGIC:  negative  NEUROLOGICAL:  negative  BEHAVIOR/PSYCH:  negative    PHYSICAL EXAM:  Vitals:    12/11/20 2226   BP: 118/72   Pulse: 90   Resp: 18   Temp: 99.5 °F (37.5 °C)   TempSrc: Oral     General appearance:  awake, alert, cooperative, no apparent distress, and appears stated age  Neurologic:  Awake, alert, oriented to name, place and time. Lungs:  No increased work of breathing, good air exchange  Abdomen:  Soft, non tender, gravid, consistent with her gestational age   Fetal heart rate:  Reassuring. Pelvis:  Adequate pelvis  Cervix: Closed 50% soft -3  Contraction frequency: irritability    Membranes:  Intact    ASSESSMENT AND PLAN:    Labor: observe  Fetus: Reassuring  Other: precautions given; pt to follow up in clinic this week.       Electronically signed by Kobe Baugh DO on 12/11/2020 at 11:05 PM

## 2020-12-16 ENCOUNTER — ROUTINE PRENATAL (OUTPATIENT)
Dept: OBGYN | Age: 25
End: 2020-12-16

## 2020-12-16 VITALS
WEIGHT: 203 LBS | HEART RATE: 91 BPM | SYSTOLIC BLOOD PRESSURE: 123 MMHG | DIASTOLIC BLOOD PRESSURE: 67 MMHG | BODY MASS INDEX: 32.62 KG/M2

## 2020-12-16 PROBLEM — Z3A.38 38 WEEKS GESTATION OF PREGNANCY: Status: RESOLVED | Noted: 2020-12-01 | Resolved: 2020-12-16

## 2020-12-16 PROBLEM — Z3A.39 39 WEEKS GESTATION OF PREGNANCY: Status: RESOLVED | Noted: 2020-12-11 | Resolved: 2020-12-16

## 2020-12-16 LAB
GLUCOSE URINE, POC: NEGATIVE
PROTEIN UA: ABNORMAL

## 2020-12-16 PROCEDURE — 99213 OFFICE O/P EST LOW 20 MIN: CPT | Performed by: OBSTETRICS & GYNECOLOGY

## 2020-12-16 PROCEDURE — 81002 URINALYSIS NONAUTO W/O SCOPE: CPT | Performed by: OBSTETRICS & GYNECOLOGY

## 2020-12-16 NOTE — PROGRESS NOTES
Patient alert and pleasant with complaints of some contractions  Here today with  for prenatal visit  Urine for protein obtained with +2 visit  Urine for glucose obtained with negative results  Fetal heart tones obtained without difficulty  Pelvic exam, no specimens obtained. Discharge instructions have been discussed with the patient. Patient advised to call our office with any questions or concerns. Voiced understanding.

## 2020-12-16 NOTE — PROGRESS NOTES
Artemio Leyva    Patient presents for routine prenatal visit today at 40w4d. Induction of labor scheduled for Friday, December 18 at  7 am. All questions were answered. Denies complaints  Denies VB, or cramping  Feeling movement at this time. Reviewed labor precautions and kick counts. Fetal Hr: 155  Cervical exam 2/60/ -2    GBS negative  Gonorrhea/ chlamydia negative     All questions and concerns addressed at this time      ICD-10-CM    1. Prenatal care in third trimester  Z34.93    2. Limited prenatal care in third trimester  O09.33         Return for postpartum visit .     Esme Hayes MD

## 2020-12-18 ENCOUNTER — APPOINTMENT (OUTPATIENT)
Dept: LABOR AND DELIVERY | Age: 25
End: 2020-12-18

## 2020-12-18 ENCOUNTER — HOSPITAL ENCOUNTER (INPATIENT)
Age: 25
LOS: 2 days | Discharge: HOME | End: 2020-12-20
Attending: OBSTETRICS & GYNECOLOGY | Admitting: OBSTETRICS & GYNECOLOGY
Payer: MEDICAID

## 2020-12-18 ENCOUNTER — ANESTHESIA (OUTPATIENT)
Dept: LABOR AND DELIVERY | Age: 25
End: 2020-12-18

## 2020-12-18 ENCOUNTER — ANESTHESIA EVENT (OUTPATIENT)
Dept: LABOR AND DELIVERY | Age: 25
End: 2020-12-18

## 2020-12-18 PROBLEM — Z34.93 NORMAL PREGNANCY, THIRD TRIMESTER: Status: ACTIVE | Noted: 2020-12-18

## 2020-12-18 LAB
ABO/RH: NORMAL
AMPHETAMINE SCREEN, URINE: NOT DETECTED
ANTIBODY SCREEN: NORMAL
BARBITURATE SCREEN URINE: NOT DETECTED
BASOPHILS ABSOLUTE: 0.01 E9/L (ref 0–0.2)
BASOPHILS RELATIVE PERCENT: 0.1 % (ref 0–2)
BENZODIAZEPINE SCREEN, URINE: NOT DETECTED
CANNABINOID SCREEN URINE: NOT DETECTED
COCAINE METABOLITE SCREEN URINE: NOT DETECTED
EOSINOPHILS ABSOLUTE: 0.09 E9/L (ref 0.05–0.5)
EOSINOPHILS RELATIVE PERCENT: 0.9 % (ref 0–6)
FENTANYL SCREEN, URINE: NOT DETECTED
HCT VFR BLD CALC: 34.3 % (ref 34–48)
HEMOGLOBIN: 10.8 G/DL (ref 11.5–15.5)
IMMATURE GRANULOCYTES #: 0.04 E9/L
IMMATURE GRANULOCYTES %: 0.4 % (ref 0–5)
LYMPHOCYTES ABSOLUTE: 1.19 E9/L (ref 1.5–4)
LYMPHOCYTES RELATIVE PERCENT: 12 % (ref 20–42)
Lab: NORMAL
MCH RBC QN AUTO: 25.2 PG (ref 26–35)
MCHC RBC AUTO-ENTMCNC: 31.5 % (ref 32–34.5)
MCV RBC AUTO: 80.1 FL (ref 80–99.9)
METHADONE SCREEN, URINE: NOT DETECTED
MONOCYTES ABSOLUTE: 0.49 E9/L (ref 0.1–0.95)
MONOCYTES RELATIVE PERCENT: 4.9 % (ref 2–12)
NEUTROPHILS ABSOLUTE: 8.11 E9/L (ref 1.8–7.3)
NEUTROPHILS RELATIVE PERCENT: 81.7 % (ref 43–80)
OPIATE SCREEN URINE: NOT DETECTED
OXYCODONE URINE: NOT DETECTED
PDW BLD-RTO: 17 FL (ref 11.5–15)
PHENCYCLIDINE SCREEN URINE: NOT DETECTED
PLATELET # BLD: 236 E9/L (ref 130–450)
PMV BLD AUTO: 10.7 FL (ref 7–12)
RBC # BLD: 4.28 E12/L (ref 3.5–5.5)
WBC # BLD: 9.9 E9/L (ref 4.5–11.5)

## 2020-12-18 PROCEDURE — 2500000003 HC RX 250 WO HCPCS

## 2020-12-18 PROCEDURE — 86901 BLOOD TYPING SEROLOGIC RH(D): CPT

## 2020-12-18 PROCEDURE — 0KQM0ZZ REPAIR PERINEUM MUSCLE, OPEN APPROACH: ICD-10-PCS | Performed by: OBSTETRICS & GYNECOLOGY

## 2020-12-18 PROCEDURE — 86900 BLOOD TYPING SEROLOGIC ABO: CPT

## 2020-12-18 PROCEDURE — 3700000025 EPIDURAL BLOCK: Performed by: ANESTHESIOLOGY

## 2020-12-18 PROCEDURE — 99024 POSTOP FOLLOW-UP VISIT: CPT | Performed by: MIDWIFE

## 2020-12-18 PROCEDURE — 2500000003 HC RX 250 WO HCPCS: Performed by: ANESTHESIOLOGY

## 2020-12-18 PROCEDURE — 80307 DRUG TEST PRSMV CHEM ANLYZR: CPT

## 2020-12-18 PROCEDURE — 3E0P7VZ INTRODUCTION OF HORMONE INTO FEMALE REPRODUCTIVE, VIA NATURAL OR ARTIFICIAL OPENING: ICD-10-PCS | Performed by: OBSTETRICS & GYNECOLOGY

## 2020-12-18 PROCEDURE — 51701 INSERT BLADDER CATHETER: CPT

## 2020-12-18 PROCEDURE — 36415 COLL VENOUS BLD VENIPUNCTURE: CPT

## 2020-12-18 PROCEDURE — 6360000002 HC RX W HCPCS: Performed by: MIDWIFE

## 2020-12-18 PROCEDURE — 59409 OBSTETRICAL CARE: CPT | Performed by: OBSTETRICS & GYNECOLOGY

## 2020-12-18 PROCEDURE — 86850 RBC ANTIBODY SCREEN: CPT

## 2020-12-18 PROCEDURE — 85025 COMPLETE CBC W/AUTO DIFF WBC: CPT

## 2020-12-18 PROCEDURE — 1220000001 HC SEMI PRIVATE L&D R&B

## 2020-12-18 PROCEDURE — 7200000001 HC VAGINAL DELIVERY

## 2020-12-18 PROCEDURE — 2580000003 HC RX 258: Performed by: MIDWIFE

## 2020-12-18 RX ORDER — NALOXONE HYDROCHLORIDE 0.4 MG/ML
0.4 INJECTION, SOLUTION INTRAMUSCULAR; INTRAVENOUS; SUBCUTANEOUS PRN
Status: DISCONTINUED | OUTPATIENT
Start: 2020-12-18 | End: 2020-12-19

## 2020-12-18 RX ORDER — ONDANSETRON 2 MG/ML
4 INJECTION INTRAMUSCULAR; INTRAVENOUS EVERY 6 HOURS PRN
Status: DISCONTINUED | OUTPATIENT
Start: 2020-12-18 | End: 2020-12-19 | Stop reason: HOSPADM

## 2020-12-18 RX ORDER — LIDOCAINE HYDROCHLORIDE 10 MG/ML
INJECTION, SOLUTION INFILTRATION; PERINEURAL
Status: COMPLETED
Start: 2020-12-18 | End: 2020-12-18

## 2020-12-18 RX ORDER — ACETAMINOPHEN 650 MG
TABLET, EXTENDED RELEASE ORAL
Status: DISCONTINUED
Start: 2020-12-18 | End: 2020-12-18 | Stop reason: WASHOUT

## 2020-12-18 RX ORDER — ACETAMINOPHEN 650 MG
TABLET, EXTENDED RELEASE ORAL
Status: COMPLETED
Start: 2020-12-18 | End: 2020-12-18

## 2020-12-18 RX ORDER — NALBUPHINE HCL 10 MG/ML
5 AMPUL (ML) INJECTION EVERY 4 HOURS PRN
Status: DISCONTINUED | OUTPATIENT
Start: 2020-12-18 | End: 2020-12-19

## 2020-12-18 RX ORDER — SODIUM CHLORIDE, SODIUM LACTATE, POTASSIUM CHLORIDE, CALCIUM CHLORIDE 600; 310; 30; 20 MG/100ML; MG/100ML; MG/100ML; MG/100ML
INJECTION, SOLUTION INTRAVENOUS CONTINUOUS
Status: DISCONTINUED | OUTPATIENT
Start: 2020-12-18 | End: 2020-12-19

## 2020-12-18 RX ORDER — LIDOCAINE HYDROCHLORIDE 10 MG/ML
INJECTION, SOLUTION INFILTRATION; PERINEURAL
Status: DISCONTINUED
Start: 2020-12-18 | End: 2020-12-18 | Stop reason: WASHOUT

## 2020-12-18 RX ORDER — EPHEDRINE SULFATE/0.9% NACL/PF 50 MG/5 ML
5 SYRINGE (ML) INTRAVENOUS PRN
Status: DISCONTINUED | OUTPATIENT
Start: 2020-12-18 | End: 2020-12-19

## 2020-12-18 RX ADMIN — Medication 87.3 MILLI-UNITS/MIN: at 22:21

## 2020-12-18 RX ADMIN — Medication 166.7 ML: at 22:10

## 2020-12-18 RX ADMIN — SODIUM CHLORIDE, POTASSIUM CHLORIDE, SODIUM LACTATE AND CALCIUM CHLORIDE: 600; 310; 30; 20 INJECTION, SOLUTION INTRAVENOUS at 10:30

## 2020-12-18 RX ADMIN — LIDOCAINE HYDROCHLORIDE 200 MG: 10 INJECTION, SOLUTION INFILTRATION; PERINEURAL at 22:10

## 2020-12-18 RX ADMIN — Medication 1 MILLI-UNITS/MIN: at 11:51

## 2020-12-18 RX ADMIN — SODIUM CHLORIDE, POTASSIUM CHLORIDE, SODIUM LACTATE AND CALCIUM CHLORIDE: 600; 310; 30; 20 INJECTION, SOLUTION INTRAVENOUS at 16:50

## 2020-12-18 RX ADMIN — Medication: at 22:03

## 2020-12-18 RX ADMIN — Medication 15 ML/HR: at 16:38

## 2020-12-18 RX ADMIN — SODIUM CHLORIDE, POTASSIUM CHLORIDE, SODIUM LACTATE AND CALCIUM CHLORIDE: 600; 310; 30; 20 INJECTION, SOLUTION INTRAVENOUS at 15:00

## 2020-12-18 ASSESSMENT — PAIN DESCRIPTION - DESCRIPTORS
DESCRIPTORS: CRAMPING

## 2020-12-18 ASSESSMENT — PAIN SCALES - GENERAL: PAINLEVEL_OUTOF10: 0

## 2020-12-18 NOTE — PROGRESS NOTES
Pt presents to l and d for scheduled IOL. Pt speak [place]. Interpretor [id number] Matthew Magallanes used to answer all admission questions and permits signed. Pts  speaks some english. . Ed 20  40 . Pt requests no males be present in room. Pt states she feels good fetal movement. Pt denies any vaginal bleeding or leaking of fluid. Pt denies any pain states she feels a little pressure in her lady parts. Liza Eubanks CNM here will discuss pts care of plan with her.

## 2020-12-18 NOTE — H&P
Department of Obstetrics and Gynecology  Nurse Practitioner Obstetrics History and Physical        CHIEF COMPLAINT:  Induction of labor    HISTORY OF PRESENT ILLNESS:  Harsh James is a 27 y.o. female , Patient's last menstrual period was 2020.,  at [address] [place] [address]. Presents to L&D for induction of labor for post dates. No complaints at this time. Pregnancy uncomplicated, late to care, transferred to clinic in 3rd trimester after receiving limited prenatal care in [place]. Last ultrasound   12/10/2020 vertex fetus EFW 6#14. GBS negative      OB History        1    Para   0    Term   0       0    AB   0    Living   0       SAB        TAB        Ectopic        Molar        Multiple        Live Births                    Estimated Due Date: Estimated Date of Delivery: 20      Pregnancy complicated by:   Patient Active Problem List   Diagnosis Code    No prenatal care in current pregnancy in third trimester O09.33           PAST OB HISTORY  OB History        1    Para   0    Term   0       0    AB   0    Living   0       SAB        TAB        Ectopic        Molar        Multiple        Live Births                      Past Medical History:          Diagnosis Date    Abnormal Pap smear of cervix        Past Surgical History:      History reviewed. No pertinent surgical history. Social History:    TOBACCO:   reports that she has never smoked. She has never used smokeless tobacco.  ETOH:   reports no history of alcohol use. DRUGS:   reports no history of drug use. Family History:   History reviewed. No pertinent family history. Medications Prior to Admission:  Medications Prior to Admission: Prenatal MV-Min-Fe Fum-FA-DHA (PRENATAL 1 PO), Take by mouth  cephALEXin (KEFLEX) 500 MG capsule, Take 1 capsule by mouth 2 times daily (Patient not taking: Reported on 2020)    Allergies:  Patient has no known allergies.       REVIEW OF SYSTEMS:          CONSTITUTIONAL : No fever, no chills   HEENT :                         Headache absent,   visual disturbances absent  CARDIOVASCULAR :    No chest pain, no palpitations, no edema   RESPIRATORY :            No pain, no shortness of breath   GASTROINTESTINAL : No N/V, no D/C,    abdominal pain absent   GENITOURINARY :      Dysuria   absent,   hematuria absent,   urinary frequency absent  MUSCULOSKELETAL:  No myalgia,   back pain absent  NEUROLOGICAL :        No migraine, no seizures. Pertinent positives and negatives addressed in HPI, other systems reviewed and negative      PHYSICAL EXAM:    /63   Pulse 80   Temp 99 °F (37.2 °C) (Oral)   Resp 16   Ht 5' 6.14\" (1.68 m)   Wt 194 lb (88 kg)   LMP 2020   BMI 31.18 kg/m²     General appearance:  awake, alert, cooperative, no apparent distress, and appears stated age  Neurologic:  Awake, alert, oriented to name, place and time.   Ambulatory to unit      Lungs:  Respirations easy and unlabored    Abdomen:   soft, gravid, non-tender,   CVA tenderness NA   Fetal position vertex by Leopold's   EFW AGA  Fetal heart rate:  Baseline Heart Rate 135   Variability moderate   Accelerations Present   Decelerations absent  Pelvis:  External Genitalia: Lesions absent  SSE:  NA  Cervix:    DILATION:  3 cm  EFFACEMENT:  80%  STATION:  -1  POSITION: mid position  CONSISTENCY:  Medium    Contractions:  occasional  Membranes:  intact      GBS: negative      Impression:  27 y.o.  40w6d induction of labor, ripe cervix, category I tracing, GBS negative    Discussed with Dr. Jean Pierre Fung:  Admit, routine labor orders, pitocin induction, may have epidural        Electronically signed by RUIZ Garcia CNM on 2020 at 9:31 AM

## 2020-12-18 NOTE — PROGRESS NOTES
Pt sitting up for epidural as requested. Pt states her pain is 10/10 lower abdomen. [place] interpretor [id number] used for epidural procedure. All pts questioned answered and explained.

## 2020-12-18 NOTE — ANESTHESIA PROCEDURE NOTES
Epidural Block    Patient location during procedure: OB  Start time: 12/18/2020 4:25 PM  End time: 12/18/2020 4:30 PM  Reason for block: labor epidural  Staffing  Performed: resident/CRNA   Anesthesiologist: Kate Mohan MD  Resident/CRNA: RUIZ Falcon CRNA  Preanesthetic Checklist  Completed: patient identified, IV checked, site marked, risks and benefits discussed, surgical consent, monitors and equipment checked, pre-op evaluation, timeout performed, anesthesia consent given, oxygen available and patient being monitored  Epidural  Patient position: sitting  Prep: ChloraPrep  Patient monitoring: cardiac monitor, continuous pulse ox and frequent blood pressure checks  Approach: midline  Location: lumbar (1-5)  Injection technique: BRIE saline  Provider prep: mask and sterile gloves  Needle  Needle type: Tuohy   Needle gauge: 18 G  Needle length: 2.5 in  Needle insertion depth: 6 cm  Catheter type: end hole  Catheter size: 20 G.   Catheter at skin depth: 12 cm  Test dose: negative  Assessment  Hemodynamics: stable  Attempts: 1

## 2020-12-18 NOTE — ANESTHESIA PRE PROCEDURE
Department of Anesthesiology  Preprocedure Note       Name:  Jayant Dee   Age:  27 y.o.  :  1995                                          MRN:  [id number]         Date:  2020      Surgeon: * No surgeons listed *    Procedure: * No procedures listed *    Medications prior to admission:   Prior to Admission medications    Medication Sig Start Date End Date Taking? Authorizing Provider   Prenatal MV-Min-Fe Fum-FA-DHA (PRENATAL 1 PO) Take by mouth   Yes Historical Provider, MD   cephALEXin (KEFLEX) 500 MG capsule Take 1 capsule by mouth 2 times daily  Patient not taking: Reported on 2020 11/3/20   John Cadet, APRN - CNM       Current medications:    Current Facility-Administered Medications   Medication Dose Route Frequency Provider Last Rate Last Admin    lactated ringers infusion   Intravenous Continuous John Cadet, APRN - CNM        oxytocin (PITOCIN) 10 unit bolus from the bag  10 Units Intravenous PRN John Cadet, APRN - CNM        And    oxytocin (PITOCIN) 30 units in 500 mL infusion  87.3 tk-units/min Intravenous PRN John Cadet, APRN - CNM        oxytocin (PITOCIN) 30 units in 500 mL infusion  1 tk-units/min Intravenous Continuous John Cadet, APRN - CNM           Allergies:  No Known Allergies    Problem List:    Patient Active Problem List   Diagnosis Code    No prenatal care in current pregnancy in third trimester O09.33    Normal pregnancy, third trimester Z34.93       Past Medical History:        Diagnosis Date    Abnormal Pap smear of cervix        Past Surgical History:  History reviewed. No pertinent surgical history.     Social History:    Social History     Tobacco Use    Smoking status: Never Smoker    Smokeless tobacco: Never Used   Substance Use Topics    Alcohol use: Never     Frequency: Never                                Counseling given: Not Answered      Vital Signs (Current):   Vitals:    20 0847 20 0904   BP:  110/63 Pulse:  80   Resp:  16   Temp:  37.2 °C (99 °F)   TempSrc:  Oral   Weight: 194 lb (88 kg)    Height: 5' 6.14\" (1.68 m)                                               BP Readings from Last 3 Encounters:   12/18/20 110/63   12/16/20 123/67   12/11/20 118/72       NPO Status: Time of last liquid consumption: 0600                        Time of last solid consumption: 0600                        Date of last liquid consumption: 12/18/20                        Date of last solid food consumption: 12/18/20    BMI:   Wt Readings from Last 3 Encounters:   12/18/20 194 lb (88 kg)   12/16/20 203 lb (92.1 kg)   12/09/20 199 lb (90.3 kg)     Body mass index is 31.18 kg/m². CBC:   Lab Results   Component Value Date    WBC 9.6 11/03/2020    RBC 4.26 11/03/2020    HGB 11.2 11/03/2020    HCT 35.3 11/03/2020    MCV 82.9 11/03/2020    RDW 14.3 11/03/2020     11/03/2020       CMP:   Lab Results   Component Value Date     11/03/2020    K 3.7 11/03/2020     11/03/2020    CO2 22 11/03/2020    BUN 6 11/03/2020    CREATININE 0.5 11/03/2020    GFRAA >60 11/03/2020    LABGLOM >60 11/03/2020    GLUCOSE 77 11/03/2020    PROT 7.2 11/03/2020    CALCIUM 10.0 11/03/2020    BILITOT <0.2 11/03/2020    ALKPHOS 103 11/03/2020    AST 13 11/03/2020    ALT 8 11/03/2020       POC Tests: No results for input(s): POCGLU, POCNA, POCK, POCCL, POCBUN, POCHEMO, POCHCT in the last 72 hours.     Coags: No results found for: PROTIME, INR, APTT    HCG (If Applicable): No results found for: PREGTESTUR, PREGSERUM, HCG, HCGQUANT     ABGs: No results found for: PHART, PO2ART, BVN9ZEG, DBI2EGS, BEART, R7MFPXBW     Type & Screen (If Applicable):  No results found for: LABABO, LABRH    Drug/Infectious Status (If Applicable):  No results found for: HIV, HEPCAB    COVID-19 Screening (If Applicable): No results found for: COVID19      Anesthesia Evaluation  Patient summary reviewed and Nursing notes reviewed no history of anesthetic complications: Airway: Mallampati: II  TM distance: >3 FB   Neck ROM: full  Mouth opening: > = 3 FB Dental: normal exam         Pulmonary:Negative Pulmonary ROS breath sounds clear to auscultation                             Cardiovascular:Negative CV ROS  Exercise tolerance: good (>4 METS),           Rhythm: regular  Rate: normal           Beta Blocker:  Not on Beta Blocker         Neuro/Psych:   Negative Neuro/Psych ROS              GI/Hepatic/Renal:   (+) morbid obesity          Endo/Other:    (+) blood dyscrasia: anemia:., .                 Abdominal:           Vascular: negative vascular ROS. Anesthesia Plan      general, spinal and epidural     ASA 2             Anesthetic plan and risks discussed with patient and spouse. Plan discussed with attending. CBC   Lab Results   Component Value Date    WBC 9.6 11/03/2020    RBC 4.26 11/03/2020    HGB 11.2 11/03/2020    HCT 35.3 11/03/2020    MCV 82.9 11/03/2020    RDW 14.3 11/03/2020     11/03/2020     CMP    Lab Results   Component Value Date     11/03/2020    K 3.7 11/03/2020     11/03/2020    CO2 22 11/03/2020    BUN 6 11/03/2020    CREATININE 0.5 11/03/2020    GFRAA >60 11/03/2020    LABGLOM >60 11/03/2020    GLUCOSE 77 11/03/2020    PROT 7.2 11/03/2020    CALCIUM 10.0 11/03/2020    BILITOT <0.2 11/03/2020    ALKPHOS 103 11/03/2020    AST 13 11/03/2020    ALT 8 11/03/2020     BMP    Lab Results   Component Value Date     11/03/2020    K 3.7 11/03/2020     11/03/2020    CO2 22 11/03/2020    BUN 6 11/03/2020    CREATININE 0.5 11/03/2020    CALCIUM 10.0 11/03/2020    GFRAA >60 11/03/2020    LABGLOM >60 11/03/2020    GLUCOSE 77 11/03/2020     POCGlucose  No results for input(s): GLUCOSE in the last 72 hours.      Coags  No results found for: PROTIME, INR, APTT    HCG (If Applicable) No results found for: PREGTESTUR, PREGSERUM, HCG, HCGQUANT     ABGs No results found for: PHART, PO2ART, TVS4IST, DIB6EYW, BEART, R5MKLALI     Type & Screen (If Applicable)  Lab Results   Component Value Date    ABORH A POS 11/03/2020     Active Problem List with ICD10 Codes  Patient Active Problem List   Diagnosis Code    No prenatal care in current pregnancy in third trimester O09.33    Normal pregnancy, third trimester Z34.93       RUIZ Nunez CRNA  December 18, 2020  11:04 AM      RUIZ Nunez CRNA   12/18/2020

## 2020-12-19 LAB
HCT VFR BLD CALC: 32 % (ref 34–48)
HEMOGLOBIN: 9.7 G/DL (ref 11.5–15.5)
MCH RBC QN AUTO: 24.3 PG (ref 26–35)
MCHC RBC AUTO-ENTMCNC: 30.3 % (ref 32–34.5)
MCV RBC AUTO: 80 FL (ref 80–99.9)
PDW BLD-RTO: 17 FL (ref 11.5–15)
PLATELET # BLD: 211 E9/L (ref 130–450)
PMV BLD AUTO: 10.8 FL (ref 7–12)
RBC # BLD: 4 E12/L (ref 3.5–5.5)
WBC # BLD: 11.9 E9/L (ref 4.5–11.5)

## 2020-12-19 PROCEDURE — 6370000000 HC RX 637 (ALT 250 FOR IP): Performed by: OBSTETRICS & GYNECOLOGY

## 2020-12-19 PROCEDURE — 36415 COLL VENOUS BLD VENIPUNCTURE: CPT

## 2020-12-19 PROCEDURE — 1220000000 HC SEMI PRIVATE OB R&B

## 2020-12-19 PROCEDURE — 2580000003 HC RX 258: Performed by: OBSTETRICS & GYNECOLOGY

## 2020-12-19 PROCEDURE — 85027 COMPLETE CBC AUTOMATED: CPT

## 2020-12-19 RX ORDER — PRENATAL WITH FERROUS FUM AND FOLIC ACID 3080; 920; 120; 400; 22; 1.84; 3; 20; 10; 1; 12; 200; 27; 25; 2 [IU]/1; [IU]/1; MG/1; [IU]/1; MG/1; MG/1; MG/1; MG/1; MG/1; MG/1; UG/1; MG/1; MG/1; MG/1; MG/1
1 TABLET ORAL DAILY
Status: DISCONTINUED | OUTPATIENT
Start: 2020-12-19 | End: 2020-12-20 | Stop reason: HOSPADM

## 2020-12-19 RX ORDER — IBUPROFEN 800 MG/1
800 TABLET ORAL EVERY 8 HOURS
Status: DISCONTINUED | OUTPATIENT
Start: 2020-12-19 | End: 2020-12-20 | Stop reason: HOSPADM

## 2020-12-19 RX ORDER — SODIUM CHLORIDE 0.9 % (FLUSH) 0.9 %
10 SYRINGE (ML) INJECTION EVERY 12 HOURS SCHEDULED
Status: DISCONTINUED | OUTPATIENT
Start: 2020-12-19 | End: 2020-12-20 | Stop reason: HOSPADM

## 2020-12-19 RX ORDER — DOCUSATE SODIUM 100 MG/1
100 CAPSULE, LIQUID FILLED ORAL 2 TIMES DAILY
Status: DISCONTINUED | OUTPATIENT
Start: 2020-12-19 | End: 2020-12-20 | Stop reason: HOSPADM

## 2020-12-19 RX ORDER — MODIFIED LANOLIN
OINTMENT (GRAM) TOPICAL PRN
Status: DISCONTINUED | OUTPATIENT
Start: 2020-12-19 | End: 2020-12-20 | Stop reason: HOSPADM

## 2020-12-19 RX ORDER — FERROUS SULFATE 325(65) MG
325 TABLET ORAL 2 TIMES DAILY WITH MEALS
Status: DISCONTINUED | OUTPATIENT
Start: 2020-12-19 | End: 2020-12-20 | Stop reason: HOSPADM

## 2020-12-19 RX ORDER — SODIUM CHLORIDE, SODIUM LACTATE, POTASSIUM CHLORIDE, CALCIUM CHLORIDE 600; 310; 30; 20 MG/100ML; MG/100ML; MG/100ML; MG/100ML
INJECTION, SOLUTION INTRAVENOUS CONTINUOUS
Status: DISCONTINUED | OUTPATIENT
Start: 2020-12-19 | End: 2020-12-20 | Stop reason: HOSPADM

## 2020-12-19 RX ORDER — SODIUM CHLORIDE 0.9 % (FLUSH) 0.9 %
10 SYRINGE (ML) INJECTION PRN
Status: DISCONTINUED | OUTPATIENT
Start: 2020-12-19 | End: 2020-12-20 | Stop reason: HOSPADM

## 2020-12-19 RX ADMIN — FERROUS SULFATE TAB 325 MG (65 MG ELEMENTAL FE) 325 MG: 325 (65 FE) TAB at 16:49

## 2020-12-19 RX ADMIN — DOCUSATE SODIUM 100 MG: 100 CAPSULE ORAL at 09:10

## 2020-12-19 RX ADMIN — FERROUS SULFATE TAB 325 MG (65 MG ELEMENTAL FE) 325 MG: 325 (65 FE) TAB at 09:10

## 2020-12-19 RX ADMIN — DOCUSATE SODIUM 100 MG: 100 CAPSULE ORAL at 21:00

## 2020-12-19 RX ADMIN — IBUPROFEN 800 MG: 800 TABLET, FILM COATED ORAL at 09:11

## 2020-12-19 RX ADMIN — SODIUM CHLORIDE, PRESERVATIVE FREE 10 ML: 5 INJECTION INTRAVENOUS at 09:12

## 2020-12-19 RX ADMIN — BENZOCAINE AND LEVOMENTHOL: 200; 5 SPRAY TOPICAL at 04:40

## 2020-12-19 RX ADMIN — METFORMIN HYDROCHLORIDE 1 TABLET: 500 TABLET, EXTENDED RELEASE ORAL at 09:10

## 2020-12-19 RX ADMIN — IBUPROFEN 800 MG: 800 TABLET, FILM COATED ORAL at 16:49

## 2020-12-19 RX ADMIN — Medication: at 04:40

## 2020-12-19 ASSESSMENT — PAIN SCALES - GENERAL
PAINLEVEL_OUTOF10: 0
PAINLEVEL_OUTOF10: 3
PAINLEVEL_OUTOF10: 2

## 2020-12-19 ASSESSMENT — PAIN DESCRIPTION - RADICULAR PAIN: RADICULAR_PAIN: ABSENT

## 2020-12-19 NOTE — ANESTHESIA POSTPROCEDURE EVALUATION
Department of Anesthesiology  Postprocedure Note    Patient: Lakia Benitez  MRN: [id number]  YOB: 1995  Date of evaluation: 12/19/2020  Time:  7:17 AM     Procedure Summary     Date: 12/18/20 Room / Location:     Anesthesia Start: 1615 Anesthesia Stop: 2205    Procedure: Labor Analgesia Diagnosis:     Scheduled Providers:  Responsible Provider: Alanna Navas MD    Anesthesia Type: general, spinal, epidural ASA Status: 2          Anesthesia Type: general, spinal, epidural    Pipo Phase I:      Pipo Phase II:      Last vitals: Reviewed and per EMR flowsheets.        Anesthesia Post Evaluation    Patient location during evaluation: bedside  Patient participation: complete - patient participated  Level of consciousness: awake and alert  Pain score: 0  Airway patency: patent  Nausea & Vomiting: no nausea and no vomiting  Complications: no  Cardiovascular status: blood pressure returned to baseline  Respiratory status: acceptable  Hydration status: euvolemic

## 2020-12-19 NOTE — PROGRESS NOTES
Subjective:    Patient without complaints. Normal lochia.       Objective:  /73   Pulse 82   Temp 98.1 °F (36.7 °C) (Oral)   Resp 18   Ht 5' 6.14\" (1.68 m)   Wt 194 lb (88 kg)   LMP 03/07/2020   SpO2 99%   Breastfeeding Unknown   BMI 31.18 kg/m²    Lungs cta  Heart rrr  Abdomen:  Uterus firm, non-tender  Extremities:  No calf pain    Assessment:  Post-partum day # 1   Vaginal delivery    Plan:Continue current care

## 2020-12-19 NOTE — L&D DELIVERY NOTE
Department of Obstetrics and Gynecology  Spontaneous Vaginal Delivery Note      Pre-operative Diagnosis:  Term pregnancy and Induced labor    Post-operative Diagnosis:  Living  infant(s) and Male    Information for the patient's :  Brenda Parikh [[id number]]                    8 lbs 12 oz/3960 gms  Information for the patient's :  Brenda Parikh [[id number]]             APGARS: 9/9    Information for the patient's :  Brenda Parikh [[id number]]             Anesthesia:  epidural anesthesia      Delivery Summary:Spontaneous vaginal delivery of a living male baby over the  perineum at 65. Nuchal CordX2 noted at delivery and released. Airways of the baby cleared of all secretion with suction bulb as soon as the head was out from the vagina. Placenta and menbranes, complete and normal were delivered at 1207. Perineum:Second degree perineal Laceration, repaired with Vicryl 3-0 after infiltrating the perineum with 1% Xylocain. Mother and Isamar Matos are in good condition. Specimen:  Cord Blood for  Screening Lab     Estimated blood loss: 300ml              Condition:  infant stable to general nursery    Blood Type and Rh: A POS        Rubella Immunity Status:   Immune           Infant Feeding:    breast    Attending Attestation: I performed the procedure.

## 2020-12-19 NOTE — PROGRESS NOTES
Live  baby boy born via  at 65. Infant cried and suctioned at abdomen. Delayed cord clamping not performed. APGARS 8/9. Mother and  stable.

## 2020-12-19 NOTE — PROGRESS NOTES
Patient admitted into room 306 , oriented to surroundings. IV in place and infusing as ordered. Pt ambulating in room tolerating well. Fundus firm @ U/U, small amount of bleeding, no clots. Pt understands some english and is able to communicate needs. Admission paperwork reviewed with Pt and significant other. Infant safety education completed. Both Pt and significant other verified understanding. Pt does not any meat,juice and tomato soup with crackers offered. Instructed to call for assistance.

## 2020-12-20 VITALS
HEART RATE: 69 BPM | TEMPERATURE: 97.8 F | RESPIRATION RATE: 16 BRPM | SYSTOLIC BLOOD PRESSURE: 105 MMHG | WEIGHT: 194 LBS | OXYGEN SATURATION: 99 % | DIASTOLIC BLOOD PRESSURE: 60 MMHG | HEIGHT: 66 IN | BODY MASS INDEX: 31.18 KG/M2

## 2020-12-20 PROCEDURE — 6370000000 HC RX 637 (ALT 250 FOR IP): Performed by: OBSTETRICS & GYNECOLOGY

## 2020-12-20 RX ORDER — FERROUS SULFATE 325(65) MG
325 TABLET ORAL
Qty: 30 TABLET | Refills: 1 | Status: SHIPPED | OUTPATIENT
Start: 2020-12-20

## 2020-12-20 RX ORDER — IBUPROFEN 800 MG/1
800 TABLET ORAL EVERY 8 HOURS PRN
Qty: 24 TABLET | Refills: 0 | Status: SHIPPED | OUTPATIENT
Start: 2020-12-20

## 2020-12-20 RX ADMIN — DOCUSATE SODIUM 100 MG: 100 CAPSULE ORAL at 08:10

## 2020-12-20 RX ADMIN — FERROUS SULFATE TAB 325 MG (65 MG ELEMENTAL FE) 325 MG: 325 (65 FE) TAB at 08:10

## 2020-12-20 RX ADMIN — IBUPROFEN 800 MG: 800 TABLET, FILM COATED ORAL at 09:27

## 2020-12-20 RX ADMIN — METFORMIN HYDROCHLORIDE 1 TABLET: 500 TABLET, EXTENDED RELEASE ORAL at 08:10

## 2020-12-20 RX ADMIN — IBUPROFEN 800 MG: 800 TABLET, FILM COATED ORAL at 01:00

## 2020-12-20 ASSESSMENT — PAIN - FUNCTIONAL ASSESSMENT
PAIN_FUNCTIONAL_ASSESSMENT: ACTIVITIES ARE NOT PREVENTED
PAIN_FUNCTIONAL_ASSESSMENT: ACTIVITIES ARE NOT PREVENTED

## 2020-12-20 ASSESSMENT — PAIN SCALES - GENERAL
PAINLEVEL_OUTOF10: 4
PAINLEVEL_OUTOF10: 2

## 2020-12-20 ASSESSMENT — PAIN DESCRIPTION - RADICULAR PAIN: RADICULAR_PAIN: ABSENT

## 2020-12-20 NOTE — LACTATION NOTE
Talked with patient using [place]  #[id number]. Pt states baby latching better- nursed much during the night. Instructed baby may nurse 8-12 times in a day, to continue to offer frequent feedings. She has no further questions at present.

## 2020-12-20 NOTE — DISCHARGE SUMMARY
Obstetric Discharge Summary    Patient Name:  Yeyo Gore    Medical Record Number:  [id number]    Attending:  Bozena Todd    Date of Admission:  2020    Date of Discharge:        Admitting Diagnosis     OB History        1    Para   1    Term   1       0    AB   0    Living   1       SAB        TAB        Ectopic        Molar        Multiple   0    Live Births   1                Reasons for Admission on 2020  7:37 AM  Normal pregnancy, third trimester [Z34.93]       Intrapartum Procedures   Spontaneous Vaginal Delivery        Postpartum/Operative Complications        Data  Information for the patient's :  Emory Vides [[id number]]   male   Birth Weight: 8 lb 11.7 oz (3.96 kg)     Discharge With Mother  Complications: No    Discharge Condition   St[place]         Discharge Meds:       Medication List      START taking these medications    ferrous sulfate 325 (65 Fe) MG tablet  Commonly known as: IRON 325  Take 1 tablet by mouth daily (with breakfast)     ibuprofen 800 MG tablet  Commonly known as: ADVIL;MOTRIN  Take 1 tablet by mouth every 8 hours as needed for Pain        CONTINUE taking these medications    PRENATAL 1 PO        STOP taking these medications    cephALEXin 500 MG capsule  Commonly known as: Leyla Alcantara           Where to Get Your Medications      You can get these medications from any pharmacy    Bring a paper prescription for each of these medications  · ferrous sulfate 325 (65 Fe) MG tablet  · ibuprofen 800 MG tablet         Discharge Information  Current Discharge Medication List      START taking these medications    Details   ibuprofen (ADVIL;MOTRIN) 800 MG tablet Take 1 tablet by mouth every 8 hours as needed for Pain  Qty: 24 tablet, Refills: 0      ferrous sulfate (IRON 325) 325 (65 Fe) MG tablet Take 1 tablet by mouth daily (with breakfast)  Qty: 30 tablet, Refills: 1         CONTINUE these medications which have NOT CHANGED    Details   Prenatal MV-Min-Fe Fum-FA-DHA (PRENATAL 1 PO) Take by mouth         STOP taking these medications       cephALEXin (KEFLEX) 500 MG capsule Comments:   Reason for Stopping:               No discharge procedures on file. Discharge to: Home  [facility]             Comments

## 2020-12-20 NOTE — PLAN OF CARE
Problem: Discharge Planning:  Goal: Discharged to appropriate level of care  Description: Discharged to appropriate level of care  Outcome: Completed     Problem: Constipation:  Goal: Bowel elimination is within specified parameters  Description: Bowel elimination is within specified parameters  Outcome: Completed     Problem: Fluid Volume - Imbalance:  Goal: Absence of imbalanced fluid volume signs and symptoms  Description: Absence of imbalanced fluid volume signs and symptoms  Outcome: Completed  Goal: Absence of postpartum hemorrhage signs and symptoms  Description: Absence of postpartum hemorrhage signs and symptoms  12/20/2020 0719 by Anurag Valente RN  Outcome: Completed  12/20/2020 0303 by Pankaj Umanzor RN  Outcome: Met This Shift     Problem: Infection - Risk of, Puerperal Infection:  Goal: Will show no infection signs and symptoms  Description: Will show no infection signs and symptoms  Outcome: Completed     Problem: Mood - Altered:  Goal: Mood stable  Description: Mood stable  12/20/2020 0719 by Anurag Valente RN  Outcome: Completed  12/20/2020 0303 by Pankaj Umanzor RN  Outcome: Met This Shift     Problem: Pain - Acute:  Goal: Pain level will decrease  Description: Pain level will decrease  12/20/2020 0719 by Anurag Valente RN  Outcome: Completed  12/20/2020 0303 by Pankaj Umanzor RN  Outcome: Ongoing
Problem: Fluid Volume - Imbalance:  Goal: Absence of postpartum hemorrhage signs and symptoms  Description: Absence of postpartum hemorrhage signs and symptoms  Outcome: Met This Shift     Problem: Mood - Altered:  Goal: Mood stable  Description: Mood stable  Outcome: Met This Shift     Problem: Pain - Acute:  Goal: Pain level will decrease  Description: Pain level will decrease  Outcome: Ongoing
the fetal heart rate, consider transcervical amnioinfusion. For patients in labor, avoid prophylactic use of continuous maternal oxygen supplementation to prevent nonreassu . .. Goal: Absence of abnormal fetal heart rate pattern  Description: Absence of abnormal fetal heart rate pattern  Outcome: Ongoing     Problem: Urinary Retention:  Goal: Experiences of bladder distention will decrease  Description: Experiences of bladder distention will decrease  Outcome: Ongoing  Goal: Urinary elimination within specified parameters  Description: Urinary elimination within specified parameters  Outcome: Ongoing     Problem: Pain:  Description: Pain management should include both nonpharmacologic and pharmacologic interventions.   Goal: Pain level will decrease  Description: Pain level will decrease  Outcome: Ongoing  Goal: Control of acute pain  Description: Control of acute pain  Outcome: Ongoing  Goal: Control of chronic pain  Description: Control of chronic pain  Outcome: Ongoing     Problem: Discharge Planning:  Goal: Discharged to appropriate level of care  Description: Discharged to appropriate level of care  Outcome: Ongoing     Problem: Constipation:  Goal: Bowel elimination is within specified parameters  Description: Bowel elimination is within specified parameters  Outcome: Ongoing     Problem: Infection - Risk of, Puerperal Infection:  Goal: Will show no infection signs and symptoms  Description: Will show no infection signs and symptoms  Outcome: Ongoing     Problem: Mood - Altered:  Goal: Mood stable  Description: Mood stable  Outcome: Ongoing

## 2020-12-20 NOTE — PROGRESS NOTES
Discharge and follow up instructions given with mother/infant teaching and verbally understood. Attempted to up the [place] citizen [place]  but they couldn't hear the teaching, so patient's  speaks [place] and [place] citizen [place] so he interpreted. Pt also understands English well.

## 2020-12-20 NOTE — PROGRESS NOTES
Post-Partum Note    PPD#2     S:  Patient without complaints. Breast feeding. Lochia normal.  Ambulating. O: /60   Pulse 69   Temp 97.8 °F (36.6 °C) (Oral)   Resp 16   Ht 5' 6.14\" (1.68 m)   Wt 194 lb (88 kg)   LMP 03/07/2020   SpO2 99%   Breastfeeding Unknown   BMI 31.18 kg/m²               ABD:    Uterus firm, non-tender. EXT:     No Mont[place]errat's. LABS:     Hemoglobin/Hematocrit:    Lab Results   Component Value Date    HGB 9.7 12/19/2020    HCT 32.0 12/19/2020       IMP:  1. PPD#2, status-post vaginal delivery            2. Patient Active Problem List   Diagnosis    No prenatal care in current pregnancy in third trimester    Normal pregnancy, third trimester    Post term pregnancy over 40 weeks              3.  Acute and chronic blood loss anemia    Plan: 1. Routine post-partum care           2. Discharge to home           [facility]. [facility]           4.   Iron supplementation